# Patient Record
Sex: FEMALE | Race: BLACK OR AFRICAN AMERICAN | Employment: STUDENT | URBAN - METROPOLITAN AREA
[De-identification: names, ages, dates, MRNs, and addresses within clinical notes are randomized per-mention and may not be internally consistent; named-entity substitution may affect disease eponyms.]

---
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
## 2024-02-04 ENCOUNTER — APPOINTMENT (OUTPATIENT)
Facility: HOSPITAL | Age: 19
End: 2024-02-04
Payer: COMMERCIAL

## 2024-02-04 ENCOUNTER — HOSPITAL ENCOUNTER (EMERGENCY)
Facility: HOSPITAL | Age: 19
Discharge: HOME | End: 2024-02-04
Attending: EMERGENCY MEDICINE
Payer: COMMERCIAL

## 2024-02-04 VITALS
RESPIRATION RATE: 17 BRPM | SYSTOLIC BLOOD PRESSURE: 118 MMHG | TEMPERATURE: 98.3 F | DIASTOLIC BLOOD PRESSURE: 80 MMHG | HEART RATE: 74 BPM | OXYGEN SATURATION: 100 % | BODY MASS INDEX: 17.48 KG/M2 | WEIGHT: 101.85 LBS

## 2024-02-04 DIAGNOSIS — R10.817 GENERALIZED ABDOMINAL TENDERNESS WITHOUT REBOUND TENDERNESS: Primary | ICD-10-CM

## 2024-02-04 DIAGNOSIS — K59.00 CONSTIPATION, UNSPECIFIED CONSTIPATION TYPE: ICD-10-CM

## 2024-02-04 LAB — HCG UR QL: NEGATIVE

## 2024-02-04 PROCEDURE — 74018 RADEX ABDOMEN 1 VIEW: CPT

## 2024-02-04 PROCEDURE — 81025 URINE PREGNANCY TEST: CPT

## 2024-02-04 PROCEDURE — 6370000000 HC RX 637 (ALT 250 FOR IP)

## 2024-02-04 PROCEDURE — 99283 EMERGENCY DEPT VISIT LOW MDM: CPT

## 2024-02-04 RX ORDER — DICYCLOMINE HYDROCHLORIDE 10 MG/1
20 CAPSULE ORAL ONCE
Status: COMPLETED | OUTPATIENT
Start: 2024-02-04 | End: 2024-02-04

## 2024-02-04 RX ORDER — DICYCLOMINE HYDROCHLORIDE 10 MG/1
20 CAPSULE ORAL 4 TIMES DAILY
Qty: 120 CAPSULE | Refills: 0 | Status: SHIPPED | OUTPATIENT
Start: 2024-02-04

## 2024-02-04 RX ORDER — ACETAMINOPHEN 325 MG/1
650 TABLET ORAL ONCE
Status: COMPLETED | OUTPATIENT
Start: 2024-02-04 | End: 2024-02-04

## 2024-02-04 RX ORDER — POLYETHYLENE GLYCOL 3350 17 G/17G
17 POWDER, FOR SOLUTION ORAL DAILY PRN
Qty: 225 G | Refills: 0 | Status: SHIPPED | OUTPATIENT
Start: 2024-02-04 | End: 2024-03-05

## 2024-02-04 RX ADMIN — DICYCLOMINE HYDROCHLORIDE 20 MG: 10 CAPSULE ORAL at 21:35

## 2024-02-04 RX ADMIN — ACETAMINOPHEN 650 MG: 325 TABLET ORAL at 21:32

## 2024-02-04 ASSESSMENT — PAIN DESCRIPTION - LOCATION: LOCATION: ABDOMEN

## 2024-02-04 ASSESSMENT — PAIN SCALES - GENERAL
PAINLEVEL_OUTOF10: 2
PAINLEVEL_OUTOF10: 6

## 2024-02-04 ASSESSMENT — PAIN DESCRIPTION - DESCRIPTORS: DESCRIPTORS: ACHING

## 2024-02-04 ASSESSMENT — PAIN - FUNCTIONAL ASSESSMENT
PAIN_FUNCTIONAL_ASSESSMENT: ACTIVITIES ARE NOT PREVENTED
PAIN_FUNCTIONAL_ASSESSMENT: 0-10

## 2024-02-04 ASSESSMENT — PAIN DESCRIPTION - PAIN TYPE: TYPE: ACUTE PAIN

## 2024-02-05 NOTE — ED NOTES
Pt provided with heat pack for abdominal pain. See MAR for medications administered. Updated on plan of care at this time.

## 2024-02-05 NOTE — ED TRIAGE NOTES
Pt reports generalized abdominal pain  that started today. Reports constipation last week and hard stools today. Denies meds pta.

## 2024-02-05 NOTE — ED PROVIDER NOTES
[facility] PEDIATRIC EMR DEPT  EMERGENCY DEPARTMENT ENCOUNTER      Pt Name: Alaina Jimenez  MRN: [id number]  Birthdate 2005  Date of evaluation: 2/4/2024  Provider: Carlton Cyr PA-C    CHIEF COMPLAINT       Chief Complaint   Patient presents with    Abdominal Pain         HISTORY OF PRESENT ILLNESS    Patient is an 18 y.o. female with history of asthma and appendectomy who presents to the ER with reports of generalized abdominal pain that started today.  Patient reports that she also was constipated last week and when she started taking Benefiber and did 1 enema.  Patient reports that today she is still having hard stools.  Patient denies any blood in the stool.  Patient denies any nausea or vomiting.  Patient reports that she has a gastroenterology appointment, but that is not until March.  Patient reports since her last emergency room visit in September she has had pain that comes and goes in her abdomen.  Patient denies any correlation with food.  Patient has not had anything for pain today.  Patient denies chest pain, shortness of breath, nausea or vomiting, diarrhea, headache, dizziness, lightheadedness, fever, or chills.  Patient reports alcohol use, former e-cigarette use, denies any current smoking or vaping, and admits to marijuana use.        Nursing Notes were reviewed.    REVIEW OF SYSTEMS       Review of Systems   Gastrointestinal:  Positive for abdominal pain and constipation.   All other systems reviewed and are negative.        PAST MEDICAL HISTORY     Past Medical History:   Diagnosis Date    Asthma          SURGICAL HISTORY       Past Surgical History:   Procedure Laterality Date    LAPAROSCOPIC APPENDECTOMY N/A 6/25/2023    APPENDECTOMY LAPAROSCOPIC, ACUTE APPENDICITIS performed by Peter Garcia Jr., MD at [facility] MAIN OR         CURRENT MEDICATIONS       Previous Medications    FEXOFENADINE (ALLEGRA) 180 MG TABLET    Take 1 tablet by mouth daily    IBUPROFEN (ADVIL;MOTRIN) 600 MG TABLET

## 2024-02-05 NOTE — DISCHARGE INSTRUCTIONS
Discussed with the today.  Please follow-up with GI by continuing to call their office to see if they have any cancellations.  Please take these medications at home as needed.  Discussed the x-ray shows mild amount of stool.  Encourage a lot of water at home.    Return to the ER with any worsening of symptoms.

## 2024-02-05 NOTE — ED NOTES
Pt discharged home with parent/guardian. Pt acting age appropriately, respirations regular and unlabored, cap refill less than two seconds. Skin pink, dry and warm. Lungs clear bilaterally. No further complaints at this time. Parent/guardian verbalized understanding of discharge paperwork and has no further questions at this time.    Education provided about continuation of care, follow up care and medication administration. Take medications as prescribed, follow up with GI. Parent/guardian able to provided teach back about discharge instructions.

## 2024-03-04 ENCOUNTER — TELEPHONE (OUTPATIENT)
Age: 19
End: 2024-03-04

## 2024-03-04 NOTE — TELEPHONE ENCOUNTER
Called patient to reschedule 3/6/24 appt w/ Dr. Drummond to afternoon back-to-back appts due to not being available in the morning. No answer, LVM.

## 2024-03-06 ENCOUNTER — TELEPHONE (OUTPATIENT)
Age: 19
End: 2024-03-06

## 2024-03-06 NOTE — TELEPHONE ENCOUNTER
Called patient in regards to a referral for Dr. Drummond. Patient did not answer, left voicemail instructing patient to call back.

## 2024-03-13 ENCOUNTER — OFFICE VISIT (OUTPATIENT)
Age: 19
End: 2024-03-13
Payer: COMMERCIAL

## 2024-03-13 VITALS
TEMPERATURE: 98.2 F | HEIGHT: 65 IN | WEIGHT: 102.4 LBS | OXYGEN SATURATION: 96 % | SYSTOLIC BLOOD PRESSURE: 90 MMHG | HEART RATE: 68 BPM | DIASTOLIC BLOOD PRESSURE: 53 MMHG | RESPIRATION RATE: 18 BRPM | BODY MASS INDEX: 17.06 KG/M2

## 2024-03-13 DIAGNOSIS — R10.31 RIGHT LOWER QUADRANT ABDOMINAL PAIN: Primary | ICD-10-CM

## 2024-03-13 PROCEDURE — 99204 OFFICE O/P NEW MOD 45 MIN: CPT | Performed by: STUDENT IN AN ORGANIZED HEALTH CARE EDUCATION/TRAINING PROGRAM

## 2024-03-13 RX ORDER — ERGOCALCIFEROL 1.25 MG/1
CAPSULE ORAL
COMMUNITY
Start: 2024-03-05

## 2024-03-13 ASSESSMENT — PATIENT HEALTH QUESTIONNAIRE - PHQ9
1. LITTLE INTEREST OR PLEASURE IN DOING THINGS: 0
SUM OF ALL RESPONSES TO PHQ QUESTIONS 1-9: 0
SUM OF ALL RESPONSES TO PHQ QUESTIONS 1-9: 0
2. FEELING DOWN, DEPRESSED OR HOPELESS: 0
SUM OF ALL RESPONSES TO PHQ QUESTIONS 1-9: 0
SUM OF ALL RESPONSES TO PHQ9 QUESTIONS 1 & 2: 0
SUM OF ALL RESPONSES TO PHQ QUESTIONS 1-9: 0

## 2024-03-13 NOTE — PROGRESS NOTES
Identified pt with two pt identifiers (name and ). Reviewed chart in preparation for visit and have obtained necessary documentation.    Alaina Jimenez is a 18 y.o. female  Chief Complaint   Patient presents with    Abdominal Pain     BP (!) 90/53   Pulse 68   Temp 98.2 °F (36.8 °C) (Oral)   Resp 18   Ht 1.651 m (5' 5\")   Wt 46.4 kg (102 lb 6.4 oz)   SpO2 96%   BMI 17.04 kg/m²     1. Have you been to the ER, urgent care clinic since your last visit?  Hospitalized since your last visit?no    2. Have you seen or consulted any other health care providers outside of the [facility] since your last visit?  Include any pap smears or colon screening. no  
perfused  Skin: no rashes, no ecchymoses, no petechiae    Results:     Lab Results   Component Value Date/Time    WBC 5.0 09/13/2023 04:30 PM    HGB 12.7 09/13/2023 04:30 PM    HCT 39.7 09/13/2023 04:30 PM     09/13/2023 04:30 PM    MCV 85.0 09/13/2023 04:30 PM     Lab Results   Component Value Date/Time     09/13/2023 04:30 PM    K 3.7 09/13/2023 04:30 PM     09/13/2023 04:30 PM    CO2 26 09/13/2023 04:30 PM    BUN 8 09/13/2023 04:30 PM     Lab Results   Component Value Date/Time    ALT 20 09/13/2023 04:30 PM    GLOB 4.3 09/13/2023 04:30 PM            Assessment:   Alaina Jimenez is a 18 y.o. female status post laparoscopic appendectomy in June of last year by Dr. Peter Garcia.  She presents today for evaluation of ongoing right lower quadrant abdominal pain since last year associated with constipation and poor p.o. intake.      In clinic today we discussed the possible etiologies of her abdominal pain.  The pain is confounded by the fact that it appears to fluctuate in location.  Indeed, on examination today the pain is mainly in the right upper quadrant.  However, the patient is clear that her pain has been mainly localized to the right lower quadrant throughout the last several months.  I would like to start by obtaining a repeat CT abdomen/pelvis.  The last CT was obtained during her ED visit on 9/23.  If negative and biopsy results of prior colonoscopy are negative, she may warrant a diagnostic laparoscopy to evaluate for possible adhesive disease.  I have suggested to her that she follow-up with Dr. Garcia after CT scan results are in as he performed her original procedure.     The patient was agreeable with above plan.  All her questions were answered to her satisfaction.    Plan:   - CT A/P   - Follow-up with Dr. Peter Garcia once imaging results are in.          Total time spent with patient: 30 minutes.      Signed By: GABBY HERRERA MD

## 2024-03-20 ENCOUNTER — HOSPITAL ENCOUNTER (OUTPATIENT)
Facility: HOSPITAL | Age: 19
Discharge: HOME | End: 2024-03-23
Attending: STUDENT IN AN ORGANIZED HEALTH CARE EDUCATION/TRAINING PROGRAM
Payer: COMMERCIAL

## 2024-03-20 DIAGNOSIS — R10.31 RIGHT LOWER QUADRANT ABDOMINAL PAIN: ICD-10-CM

## 2024-03-20 PROCEDURE — 74177 CT ABD & PELVIS W/CONTRAST: CPT

## 2024-03-20 PROCEDURE — 6360000004 HC RX CONTRAST MEDICATION: Performed by: STUDENT IN AN ORGANIZED HEALTH CARE EDUCATION/TRAINING PROGRAM

## 2024-03-20 RX ADMIN — IOPAMIDOL 100 ML: 755 INJECTION, SOLUTION INTRAVENOUS at 14:31

## 2024-03-26 ENCOUNTER — TELEPHONE (OUTPATIENT)
Age: 19
End: 2024-03-26

## 2024-03-26 NOTE — TELEPHONE ENCOUNTER
Returned call to Ms Jimenez verified patient with 2 patient identifiers.     Reviewed notes patient was seen by Dr Drummond on 3/13/24 status post laparoscopic appendectomy on 6/25/2023 by Dr. Peter Garcia patient was seen for right lower quadrant abdominal pain.     MD advised CT A/P Follow up with Dr Garcia after imaging results.     I will speak with Dr Garcia and call patient back.     Dr Garcia advised to have patient scheduled to see him this Thursday. Appointment scheduled at 10:40.

## 2024-03-26 NOTE — TELEPHONE ENCOUNTER
Patient would like to speak to provider or nurse to follow up about last visit she had with provider.

## 2024-03-28 ENCOUNTER — OFFICE VISIT (OUTPATIENT)
Age: 19
End: 2024-03-28
Payer: COMMERCIAL

## 2024-03-28 ENCOUNTER — TELEPHONE (OUTPATIENT)
Age: 19
End: 2024-03-28

## 2024-03-28 VITALS
OXYGEN SATURATION: 99 % | DIASTOLIC BLOOD PRESSURE: 60 MMHG | BODY MASS INDEX: 17.23 KG/M2 | TEMPERATURE: 97.4 F | RESPIRATION RATE: 19 BRPM | HEART RATE: 58 BPM | HEIGHT: 65 IN | WEIGHT: 103.4 LBS | SYSTOLIC BLOOD PRESSURE: 98 MMHG

## 2024-03-28 DIAGNOSIS — K50.00 CROHN'S DISEASE OF SMALL INTESTINE WITHOUT COMPLICATION (HCC): ICD-10-CM

## 2024-03-28 DIAGNOSIS — R10.30 LOWER ABDOMINAL PAIN: Primary | ICD-10-CM

## 2024-03-28 PROCEDURE — 99214 OFFICE O/P EST MOD 30 MIN: CPT | Performed by: SURGERY

## 2024-03-28 RX ORDER — IBUPROFEN 200 MG
200 TABLET ORAL EVERY 6 HOURS PRN
COMMUNITY

## 2024-03-28 RX ORDER — BUDESONIDE 3 MG/1
3 CAPSULE, COATED PELLETS ORAL EVERY MORNING
COMMUNITY
Start: 2024-03-27

## 2024-03-28 ASSESSMENT — PATIENT HEALTH QUESTIONNAIRE - PHQ9
SUM OF ALL RESPONSES TO PHQ QUESTIONS 1-9: 0
SUM OF ALL RESPONSES TO PHQ QUESTIONS 1-9: 0
1. LITTLE INTEREST OR PLEASURE IN DOING THINGS: NOT AT ALL
SUM OF ALL RESPONSES TO PHQ QUESTIONS 1-9: 0
SUM OF ALL RESPONSES TO PHQ QUESTIONS 1-9: 0

## 2024-03-28 ASSESSMENT — ENCOUNTER SYMPTOMS
BLOOD IN STOOL: 0
ABDOMINAL DISTENTION: 0
VOMITING: 0
NAUSEA: 0
DIARRHEA: 0
CONSTIPATION: 0
SHORTNESS OF BREATH: 0
ABDOMINAL PAIN: 1

## 2024-03-28 NOTE — ASSESSMENT & PLAN NOTE
Still present, CT results reviewed.  No obvious source. This has been ongoing for some time and is not getting any better.  Reviewed endoscopy results, given the terminal ileitis and associated findings, this is likely crohn's disease or in that spectrum.  She was prescribed steroids for this and I think this is the best course.   See me as needed

## 2024-03-28 NOTE — PROGRESS NOTES
Identified pt with two pt identifiers (name and ). Reviewed chart in preparation for visit and have obtained necessary documentation.    Alaina Jimenez is a 18 y.o. female  Chief Complaint   Patient presents with    Follow-up    Abdominal Pain     BP 98/60 (Site: Left Upper Arm, Position: Sitting, Cuff Size: Small Adult)   Pulse 58   Temp 97.4 °F (36.3 °C)   Resp 19   Ht 1.651 m (5' 5\")   Wt 46.9 kg (103 lb 6.4 oz)   SpO2 99%   BMI 17.21 kg/m²     1. Have you been to the ER, urgent care clinic since your last visit?  Hospitalized since your last visit?no    2. Have you seen or consulted any other health care providers outside of the [facility] since your last visit?  Include any pap smears or colon screening. no    
for this.            Other    Lower abdominal pain - Primary     Still present, CT results reviewed.  No obvious source. This has been ongoing for some time and is not getting any better.  Reviewed endoscopy results, given the terminal ileitis and associated findings, this is likely crohn's disease or in that spectrum.  She was prescribed steroids for this and I think this is the best course.   See me as needed         Greater than 40 minutes face to face time spent discussing diagnosis and treatment options       I, Peter Garcia Jr., MD personally performed the services described in this document.   This documentation was facilitated by voice recognition software and may contain inadvertent typographical errors.  If there are substantial concerns about the content of this note that may affect patient care, please contact me for clarification.

## 2024-04-22 ENCOUNTER — HOSPITAL ENCOUNTER (OUTPATIENT)
Facility: HOSPITAL | Age: 19
Discharge: HOME | End: 2024-04-25
Attending: INTERNAL MEDICINE
Payer: COMMERCIAL

## 2024-04-22 DIAGNOSIS — R10.9 ABDOMINAL PAIN, UNSPECIFIED ABDOMINAL LOCATION: ICD-10-CM

## 2024-04-22 DIAGNOSIS — R19.4 CHANGE IN BOWEL HABITS: ICD-10-CM

## 2024-04-22 DIAGNOSIS — E53.8 B12 DEFICIENCY: ICD-10-CM

## 2024-04-22 DIAGNOSIS — E55.9 VITAMIN D DEFICIENCY: ICD-10-CM

## 2024-04-22 DIAGNOSIS — R63.4 WEIGHT LOSS: ICD-10-CM

## 2024-04-22 DIAGNOSIS — K63.89 OTHER SPECIFIED DISEASES OF INTESTINE: ICD-10-CM

## 2024-04-22 DIAGNOSIS — Z90.49 HISTORY OF APPENDECTOMY: ICD-10-CM

## 2024-04-22 DIAGNOSIS — D64.9 ANEMIA, UNSPECIFIED TYPE: ICD-10-CM

## 2024-04-22 PROCEDURE — 6360000004 HC RX CONTRAST MEDICATION: Performed by: INTERNAL MEDICINE

## 2024-04-22 PROCEDURE — 74177 CT ABD & PELVIS W/CONTRAST: CPT

## 2024-04-22 RX ADMIN — IOPAMIDOL 100 ML: 755 INJECTION, SOLUTION INTRAVENOUS at 15:14

## (undated) DEVICE — GLOVE ORANGE PI 7 1/2   MSG9075

## (undated) DEVICE — GARMENT,MEDLINE,DVT,INT,CALF,MED, GEN2: Brand: MEDLINE

## (undated) DEVICE — SUTURE SZ 0 27IN 5/8 CIR UR-6  TAPER PT VIOLET ABSRB VICRYL J603H

## (undated) DEVICE — SUTURE MCRYL SZ 4-0 L27IN ABSRB UD L19MM PS-2 1/2 CIR PRIM Y426H

## (undated) DEVICE — TROCAR: Brand: KII® OPTICAL ACCESS SYSTEM

## (undated) DEVICE — LAPAROSCOPIC TROCAR SLEEVE/SINGLE USE: Brand: KII® OPTICAL ACCESS SYSTEM

## (undated) DEVICE — Device

## (undated) DEVICE — SYSTEM EVAC SMOKE LAPARSCOPIC

## (undated) DEVICE — 1LYRTR 16FR10ML 100%SILI SNAP: Brand: MEDLINE INDUSTRIES, INC.

## (undated) DEVICE — BAG SPEC REM 224ML W4XL6IN DIA10MM 1 HND GYN DISP ENDOPCH

## (undated) DEVICE — GLOVE SURG SZ 75 L12IN FNGR THK79MIL GRN LTX FREE

## (undated) DEVICE — GENERAL LAPAROSCOPY - SMH: Brand: MEDLINE INDUSTRIES, INC.

## (undated) DEVICE — HYPODERMIC SAFETY NEEDLE: Brand: MAGELLAN

## (undated) DEVICE — LOOP LIG SUT SZ 0 L18IN ABSRB POLYDIOXANONE MFIL PDS II

## (undated) DEVICE — RELOAD STPL L45MM H1-2.6MM MESENTERY THN TISS WHT GRIPPING

## (undated) DEVICE — 4-PORT MANIFOLD: Brand: NEPTUNE 2

## (undated) DEVICE — TROCAR: Brand: KII® SLEEVE

## (undated) DEVICE — LIQUIBAND RAPID ADHESIVE 36/CS 0.8ML: Brand: MEDLINE

## (undated) DEVICE — INSUFFLATION NEEDLE TO ESTABLISH PNEUMOPERITONEUM.: Brand: INSUFFLATION NEEDLE